# Patient Record
Sex: MALE | Race: WHITE | NOT HISPANIC OR LATINO | URBAN - METROPOLITAN AREA
[De-identification: names, ages, dates, MRNs, and addresses within clinical notes are randomized per-mention and may not be internally consistent; named-entity substitution may affect disease eponyms.]

---

## 2019-06-21 ENCOUNTER — EMERGENCY (EMERGENCY)
Facility: HOSPITAL | Age: 54
LOS: 1 days | Discharge: HOME | End: 2019-06-21
Attending: EMERGENCY MEDICINE | Admitting: EMERGENCY MEDICINE
Payer: COMMERCIAL

## 2019-06-21 VITALS
SYSTOLIC BLOOD PRESSURE: 159 MMHG | WEIGHT: 199.96 LBS | HEART RATE: 64 BPM | OXYGEN SATURATION: 100 % | TEMPERATURE: 98 F | DIASTOLIC BLOOD PRESSURE: 91 MMHG | RESPIRATION RATE: 18 BRPM | HEIGHT: 74 IN

## 2019-06-21 VITALS
OXYGEN SATURATION: 100 % | TEMPERATURE: 97 F | RESPIRATION RATE: 13 BRPM | DIASTOLIC BLOOD PRESSURE: 76 MMHG | SYSTOLIC BLOOD PRESSURE: 138 MMHG | HEART RATE: 66 BPM

## 2019-06-21 PROCEDURE — 73590 X-RAY EXAM OF LOWER LEG: CPT | Mod: 26,RT

## 2019-06-21 PROCEDURE — 96372 THER/PROPH/DIAG INJ SC/IM: CPT

## 2019-06-21 PROCEDURE — 99284 EMERGENCY DEPT VISIT MOD MDM: CPT | Mod: 25

## 2019-06-21 PROCEDURE — 99284 EMERGENCY DEPT VISIT MOD MDM: CPT

## 2019-06-21 PROCEDURE — 73590 X-RAY EXAM OF LOWER LEG: CPT

## 2019-06-21 RX ORDER — ACETAMINOPHEN 500 MG
650 TABLET ORAL ONCE
Refills: 0 | Status: COMPLETED | OUTPATIENT
Start: 2019-06-21 | End: 2019-06-21

## 2019-06-21 RX ORDER — OXYCODONE HYDROCHLORIDE 5 MG/1
1 TABLET ORAL
Qty: 12 | Refills: 0
Start: 2019-06-21 | End: 2019-06-22

## 2019-06-21 RX ORDER — SERTRALINE 25 MG/1
0 TABLET, FILM COATED ORAL
Qty: 0 | Refills: 0 | DISCHARGE

## 2019-06-21 RX ORDER — OXYCODONE AND ACETAMINOPHEN 5; 325 MG/1; MG/1
1 TABLET ORAL ONCE
Refills: 0 | Status: DISCONTINUED | OUTPATIENT
Start: 2019-06-21 | End: 2019-06-21

## 2019-06-21 RX ORDER — KETOROLAC TROMETHAMINE 30 MG/ML
60 SYRINGE (ML) INJECTION ONCE
Refills: 0 | Status: DISCONTINUED | OUTPATIENT
Start: 2019-06-21 | End: 2019-06-21

## 2019-06-21 RX ADMIN — OXYCODONE AND ACETAMINOPHEN 1 TABLET(S): 5; 325 TABLET ORAL at 11:56

## 2019-06-21 RX ADMIN — OXYCODONE AND ACETAMINOPHEN 1 TABLET(S): 5; 325 TABLET ORAL at 12:52

## 2019-06-21 RX ADMIN — Medication 650 MILLIGRAM(S): at 14:47

## 2019-06-21 RX ADMIN — Medication 60 MILLIGRAM(S): at 13:49

## 2019-06-21 RX ADMIN — Medication 60 MILLIGRAM(S): at 14:46

## 2019-06-21 NOTE — ED PROVIDER NOTE - MUSCULOSKELETAL, MLM
Spine appears normal, range of motion is not limited, no muscle or joint tenderness R leg: +splint noted in place to R lower leg, toes warm & mobile, cap refill<2sec, no calf/R leg swelling/erythema/ecchymosis noted, skin intact, 2+ DPs and PTs, no signs of compartment syndrome, NVI; Spine appears normal, range of motion is not limited

## 2019-06-21 NOTE — ED ADULT NURSE NOTE - NSIMPLEMENTINTERV_GEN_ALL_ED
Implemented All Fall Risk Interventions:  [place] to call system. Call bell, personal items and telephone within reach. Instruct patient to call for assistance. Room bathroom lighting operational. Non-slip footwear when patient is off stretcher. Physically safe environment: no spills, clutter or unnecessary equipment. Stretcher in lowest position, wheels locked, appropriate side rails in place. Provide visual cue, wrist band, yellow gown, etc. Monitor gait and stability. Monitor for mental status changes and reorient to person, place, and time. Review medications for side effects contributing to fall risk. Reinforce activity limits and safety measures with patient and family.

## 2019-06-21 NOTE — ED ADULT NURSE NOTE - OBJECTIVE STATEMENT
s/p sports related injury "+ fx rt lower leg" on 06/17" was waiting for orthopaedic appointment has developed > pain then originally felt s/p sports related injury "+ fx rt lower leg" on 06/17" was waiting for orthopedist appointment has developed > pain then originally felt

## 2019-06-21 NOTE — ED PROVIDER NOTE - PROGRESS NOTE DETAILS
Pt examined by ED attending, Dr. Sykes who agreed with disposition and plan. posterior/sugar tong splint reapplied with slightly looser ace bandage dressing and pt states that he feels much better after. Pt states that he has appt with his ortho on Monday, noted to be ambulating in ED with crutches without any distress, advised to continue oxycodone and take tylenol and motrin along for breakthrough pain. NVI.

## 2019-06-21 NOTE — ED PROVIDER NOTE - CLINICAL SUMMARY MEDICAL DECISION MAKING FREE TEXT BOX
55 yo M with c/o R lower lateral leg pain x few days, sp fall on monday while playing hockey and had xrays at [facility] yesterday with +fibula fx and splinted, did not make it to ortho appt today due to pain, no signs of compartment syndrome or DVT on exam, will reapply splint/ace wrap, xrays, pain meds, re-assess, f/u ortho

## 2019-06-21 NOTE — ED PROVIDER NOTE - OBJECTIVE STATEMENT
55 y/o M presents with c/o R lower leg pain x 2 days. Pt states that he injured his R lower leg while playing hockey on Monday this week. States that he was seen at [facility] ed yesterday, had xrays, told to have fx of R fibula, splint placed and discharged home on oxycodone. Pt states that he has an appt with his Orthopedist at 1:45pm today but was in too much pain and could not make it. States that he has been taking oxycodone but not exactly as prescribed since he did not want to run out of it, took last dose at 4:30am this morning. Denies numbness, tingling, calf pain, other injuries/symptoms.

## 2019-06-21 NOTE — ED PROVIDER NOTE - ATTENDING CONTRIBUTION TO CARE
I have personally performed a face to face diagnostic evaluation on this patient.  I have reviewed the PA note and agree with the history, exam, and plan of care, except as noted.  History and Exam by me shows right lower leg pain this past today p injury this past Monday.   Dx fibula fx on right and splinted.  no other complaint.  right lower leg- mild tenderness to right lat lower leg c no swelling, erythema or crepitus.   xr right tib-fib - healing fx of fibula.  pt felt better p splinter replacement and ambulate in the ED c crutches.

## 2019-06-21 NOTE — ED ADULT NURSE REASSESSMENT NOTE - NS ED NURSE REASSESS COMMENT FT1
pt able to ambulate with crutches the length of spencer tolerated well. pt states pain level has improved. D/C pending